# Patient Record
Sex: FEMALE | Race: WHITE | NOT HISPANIC OR LATINO | URBAN - METROPOLITAN AREA
[De-identification: names, ages, dates, MRNs, and addresses within clinical notes are randomized per-mention and may not be internally consistent; named-entity substitution may affect disease eponyms.]

---

## 2017-10-14 ENCOUNTER — EMERGENCY (EMERGENCY)
Facility: HOSPITAL | Age: 47
LOS: 0 days | Discharge: HOME | End: 2017-10-14

## 2017-10-14 DIAGNOSIS — I10 ESSENTIAL (PRIMARY) HYPERTENSION: ICD-10-CM

## 2017-10-14 DIAGNOSIS — Z79.899 OTHER LONG TERM (CURRENT) DRUG THERAPY: ICD-10-CM

## 2017-10-14 DIAGNOSIS — K62.5 HEMORRHAGE OF ANUS AND RECTUM: ICD-10-CM

## 2017-10-14 DIAGNOSIS — R07.9 CHEST PAIN, UNSPECIFIED: ICD-10-CM

## 2017-10-14 DIAGNOSIS — Z87.891 PERSONAL HISTORY OF NICOTINE DEPENDENCE: ICD-10-CM

## 2017-10-14 DIAGNOSIS — E78.00 PURE HYPERCHOLESTEROLEMIA, UNSPECIFIED: ICD-10-CM

## 2018-05-23 ENCOUNTER — EMERGENCY (EMERGENCY)
Facility: HOSPITAL | Age: 48
LOS: 0 days | Discharge: HOME | End: 2018-05-23
Attending: EMERGENCY MEDICINE | Admitting: EMERGENCY MEDICINE

## 2018-05-23 VITALS
SYSTOLIC BLOOD PRESSURE: 156 MMHG | OXYGEN SATURATION: 95 % | WEIGHT: 199.96 LBS | HEART RATE: 97 BPM | TEMPERATURE: 97 F | RESPIRATION RATE: 18 BRPM | DIASTOLIC BLOOD PRESSURE: 78 MMHG | HEIGHT: 64 IN
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
## 2018-05-23 DIAGNOSIS — S61.411A LACERATION WITHOUT FOREIGN BODY OF RIGHT HAND, INITIAL ENCOUNTER: ICD-10-CM

## 2018-05-23 DIAGNOSIS — I10 ESSENTIAL (PRIMARY) HYPERTENSION: ICD-10-CM

## 2018-05-23 DIAGNOSIS — S61.212A LACERATION WITHOUT FOREIGN BODY OF RIGHT MIDDLE FINGER WITHOUT DAMAGE TO NAIL, INITIAL ENCOUNTER: ICD-10-CM
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
## 2018-05-23 RX ORDER — TETANUS TOXOID, REDUCED DIPHTHERIA TOXOID AND ACELLULAR PERTUSSIS VACCINE, ADSORBED 5; 2.5; 8; 8; 2.5 [IU]/.5ML; [IU]/.5ML; UG/.5ML; UG/.5ML; UG/.5ML
0.5 SUSPENSION INTRAMUSCULAR ONCE
Qty: 0 | Refills: 0 | Status: COMPLETED | OUTPATIENT
Start: 2018-05-23 | End: 2018-05-23

## 2018-05-23 RX ADMIN — TETANUS TOXOID, REDUCED DIPHTHERIA TOXOID AND ACELLULAR PERTUSSIS VACCINE, ADSORBED 0.5 MILLILITER(S): 5; 2.5; 8; 8; 2.5 SUSPENSION INTRAMUSCULAR at 22:04

## 2018-05-23 NOTE — ED PROVIDER NOTE - MEDICAL DECISION MAKING DETAILS
I personally evaluated the patient. I reviewed the Resident’s or Physician Assistant’s note (as assigned above), and agree with the findings and plan except as documented in my note. I have full discussed the medical management and delivery of care with the patient. Patient confirms understanding and has been given detailed return precautions. Patient instructed to return to the ED should symptoms persist or worsen. Patient is well appearing upon discharge. Patient understands to return in 10-12 for suture removal

## 2018-05-23 NOTE — ED PROVIDER NOTE - ATTENDING CONTRIBUTION TO CARE
47 yo F presents to ED with R hand lacerations after washing dishes 30 min ago. Patient stated that glass broke uin her hand. NO cp/sob, No n/v/d, no loc, no fever.     CONSTITUTIONAL: Well-developed; well-nourished; in no acute distress. Sitting up and providing appropriate history and physical examination  SKIN: + laceration to the right 3-4 mcp palmar 1 cm and distal tuft 2 cm, cleansed, no glass in wound, skin exam is warm and dry, no acute rash.  HEAD: Normocephalic; atraumatic.  EYES: PERRL, 3 mm bilateral, no nystagmus, EOM intact; conjunctiva and sclera clear.  ENT: No nasal discharge; airway clear.  EXT: Normal ROM. No clubbing, cyanosis or edema. Dp and Pt Pulses intact. Cap refill less than 3 seconds  NEURO: CN 2-12 intact, normal finger to nose, normal romberg, stable gait, no sensory or motor deficits, Alert, oriented, grossly unremarkable. No Focal deficits. GCS 15. NIH 0  PSYCH: Cooperative, appropriate.

## 2018-05-23 NOTE — ED ADULT TRIAGE NOTE - CHIEF COMPLAINT QUOTE
pt laceration  to  r middle finger and  between ring and middle finger. bleeding controlled with gauze pad by pt.  unaware of last tetanus.

## 2018-05-23 NOTE — ED PROVIDER NOTE - PHYSICAL EXAMINATION
CONSTITUTIONAL: Well-developed; well-nourished; in no acute distress.   SKIN: warm, dry  HEAD: Normocephalic; atraumatic.  EXT: Normal ROM.  2+ Distal pulses. Sensation intact. 5./5 motor. Cap refill <2 sec. Irregular laceration to distal tuft of third R digit, no foreign body, 1 cm laceration to 3rd mcp volar aspect, no foreign body.   NEURO: Alert, oriented, grossly unremarkable  PSYCH: Cooperative, appropriate.

## 2018-05-23 NOTE — ED PROCEDURE NOTE - CPROC ED POST PROC CARE GUIDE1
Instructed patient/caregiver to follow-up with primary care physician./Instructed patient/caregiver regarding signs and symptoms of infection./Keep the cast/splint/dressing clean and dry./Verbal/written post procedure instructions were given to patient/caregiver.
Instructed patient/caregiver regarding signs and symptoms of infection./Keep the cast/splint/dressing clean and dry./Instructed patient/caregiver to follow-up with primary care physician./Verbal/written post procedure instructions were given to patient/caregiver.

## 2019-04-17 NOTE — ED ADULT NURSE NOTE - MODE OF DISCHARGE
PHYSICAL THERAPY EVALUATION WITH DISCHARGE Patient: Royal aSleem (57 y.o. female) Date: 4/17/2019 Primary Diagnosis: AICD discharge [Z45.02] Precautions:   Fall(BOWENS) ASSESSMENT :  
Pt seen following request for RW assessment. Upon speaking with pt and , pt sounded appropriate for rollator vs RW. Pt with good-fair balance though quickly fatigues and presents with fall risk with BOWENS associated with her cardiac disease. Will place rollator order, discussed additional energy conserving methods and safety implements for home setting. Will also provide [address] for future DME needs. Pt and spouse to determine if they can afford ordering rollator through insurance or if they'd like to obtain independently. Pt with stairs barrier for returning home though pt states they are planning to move to more accessible residence in the next few months. Stair training politely refused this morning. Please contact with any additional needs should they arise before return home. The following are barriers to independence while in acute care:  
-Cognitive and/or behavioral: NA 
-Medical condition: ROM, strength, functional endurance, standing balance and pulmonary tolerance   
-Other:    
 
Discharge recommendations: assistance from spouse as needed Equipment recommendations for successful discharge (if) home: rollator PLAN : 
Discharging further skilled acute physical therapy at this time. SUBJECTIVE:  
Patient stated I am short of breath, I just have to rest... OBJECTIVE DATA SUMMARY:  
HISTORY:   
Past Medical History:  
Diagnosis Date  Atrial fibrillation ([address] Dr. Efren Prado  CAD (coronary artery disease)  Chronic anticoagulation   
 was on NOAC before. ?had clot on noac. now on warfarin. Dr. Smooth Desai monitors INR  Congestive heart failure ([address]  DDD (degenerative disc disease), cervical 1/30/2018  DDD (degenerative disc disease), thoracic 1/30/2018  Depression   
 no h/o hospitalization  Essential hypertension 1/8/2018  Heart failure ([address]  Incidental pulmonary nodule 2mm, RLL- no follow up needed 2/16/2018 Noted on abdomen/pelvis CT Feb 2018. No smoking history. 1. No renal, ureteral, or bladder calculi. 2. No acute findings. 3. 2 mm pulmonary nodule right lower lobe. No follow-up needed if the patient is low risk. If the patient is high-risk, CT could be considered in 1 year.    
 Insomnia  Kidney infarction ([address] 2017  
 d/t clot per pt. recalls being on blood thinner.  Lymphedema of left arm   
 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   
 and defib. Dr. Carito Stanton  Stomach flu 2015  
 hospital admit X2 Past Surgical History:  
Procedure Laterality Date  HX BREAST BIOPSY Right 2010 [facility] [id number]  HX HEART CATHETERIZATION  2006  
 d/t abnl stress test, per pat, test was normal  
 HX HEART CATHETERIZATION  ~2009, 2012  
 per pt normal.   
 HX HYSTERECTOMY  2012  
 d/t heavy bleeding. ?endometriosis. +fibroid. per pt, no cancer.  HX PACEMAKER  2010, 2011, 2012, 3/29/2018  
 first 2010 (infected), replacement 2011 (lead not placed correctly), 2nd replacement 2012- pacemaker, defibrillator, 3rd replacement 2018 Prior Level of Function/Home Situation: Ian with RW for short distances with quick fatigue; +fall hx Personal factors and/or comorbidities impacting plan of care: supportive spouse Home Situation Home Environment: Private residence One/Two Story Residence: Two story Living Alone: No 
Support Systems: Spouse/Significant Other/Partner Patient Expects to be Discharged to[de-identified] Private residence Current DME Used/Available at Home: Walker, rolling EXAMINATION/PRESENTATION/DECISION MAKING:  
Critical Behavior: 
Neurologic State: Alert, Appropriate for age Orientation Level: Appropriate for age Cognition: Follows commands Safety/Judgement: Fall prevention Hearing: Auditory Auditory Impairment: None Range Of Motion: 
AROM: Generally decreased, functional 
  
  
  
  
  
  
  
Strength:   
Strength: Generally decreased, functional 
 
Coordination: 
Coordination: Within functional limits Functional Mobility: 
Bed Mobility: 
Rolling: Independent Supine to Sit: Independent Sit to Supine: Independent Scooting: Supervision; Additional time Transfers: 
 Supervision for sit to stand and stand to sit with RW; supervision for EOB to chair stand-pivot transfer Balance:  
Sitting: Intact Standing: Intact; With support Ambulation/Gait Training: 
Distance (ft): 25 Feet (ft)(x2 with standing rest break) Assistive Device: Walker, rolling Ambulation - Level of Assistance: Supervision Gait Description (WDL): Exceptions to [facility] Gait Abnormalities: Decreased step clearance Speed/Ita: Slow;Pace decreased (<100 feet/min) Step Length: Right shortened;Left shortened Stairs: 
  
Stairs - Level of Assistance: (Deferred by pt and ) Functional Measure: 
Barthel Index: 
 
Bathin Bladder: 10 Bowels: 10 
Groomin Dressin Feeding: 10 Mobility: 10 Stairs: 0(NT) 
Toilet Use: 5 Transfer (Bed to Chair and Back): 15 Total: 75/100 Percentage of impairment  
0% 1-19% 20-39% 40-59% 60-79% 80-99% 100% Barthel Score 0-100 100 99-80 79-60 59-40 20-39 1-19 
 0 The Barthel ADL Index: Guidelines 1. The index should be used as a record of what a patient does, not as a record of what a patient could do. 2. The main aim is to establish degree of independence from any help, physical or verbal, however minor and for whatever reason. 3. The need for supervision renders the patient not independent. 4. A patient's performance should be established using the best available evidence.  Asking the patient, friends/relatives and nurses are the usual sources, but direct observation and common sense are also important. However direct testing is not needed. 5. Usually the patient's performance over the preceding 24-48 hours is important, but occasionally longer periods will be relevant. 6. Middle categories imply that the patient supplies over 50 per cent of the effort. 7. Use of aids to be independent is allowed. Lillie Farooq., Barthel, D.W. ([id number]). Functional evaluation: the Barthel Index. [facility] (14)2. ALEXANDER Taylor, Reba Montilla., Alonzo Castañeda., [facility], [facility] (1999). Measuring the change indisability after inpatient rehabilitation; comparison of the responsiveness of the Barthel Index and Functional [place] Measure. Journal of Neurology, Neurosurgery, and Psychiatry, 66(4), 432-333. GATO Orr, MAN Fowler, & Moises Alvarez MLethaA. (2004.) Assessment of post-stroke quality of life in cost-effectiveness studies: The usefulness of the Barthel Index and the EuroQoL-5D. [facility], 13, 788-29 Pain: 
NA Activity Tolerance:  
Good and Fair Please refer to the flowsheet for vital signs taken during this treatment. After treatment patient left:  
Up in chair Caregiver at bedside Call light within reach COMMUNICATION/EDUCATION:  
The patients plan of care was discussed with: Registered Nurse. Thank you for this referral. 
Chelsey Enriquez Time Calculation: 18 mins Ambulatory

## 2021-09-09 NOTE — ED ADULT TRIAGE NOTE - PAIN RATING/NUMBER SCALE (0-10): ACTIVITY
2 Banner Transposition Flap Text: The defect edges were debeveled with a #15 scalpel blade.  Given the location of the defect and the proximity to free margins a Banner transposition flap was deemed most appropriate.  Using a sterile surgical marker, an appropriate flap drawn around the defect. The area thus outlined was incised deep to adipose tissue with a #15 scalpel blade.  The skin margins were undermined to an appropriate distance in all directions utilizing iris scissors.

## 2021-10-21 NOTE — ED PROVIDER NOTE - OBJECTIVE STATEMENT
47 yo F presents to ED with R hand lacerations after washing dishes 30 min ago. Denies any fever, chills, cp, sob, nausea, vomiting, dizziness. (1) obesity (BMI greater than 25)

## 2024-02-28 NOTE — ED ADULT TRIAGE NOTE - BSA (M2)
1.96
[de-identified] : The patient is a 42 year F referred by Dr. Mcintosh for consultation regarding left breast mass, here for initial visit.  Denies nipple discharge, pain.  No IVETTE, got hysterectomy 2017.  Denies no meds at this time  12/23/2023 B/L SM (NW) revealed fatty breasts TC5.5% (baseline) - L UIQ N13: 4 mm circumscribed mass - R neg - BR0  1/5/2024 L US (NW) - L UIQ: nonspecific appearing fatty and glandular tissue -> f/u in L DM 6 months - BR3